# Patient Record
Sex: MALE | Race: WHITE | NOT HISPANIC OR LATINO | URBAN - NONMETROPOLITAN AREA
[De-identification: names, ages, dates, MRNs, and addresses within clinical notes are randomized per-mention and may not be internally consistent; named-entity substitution may affect disease eponyms.]

---

## 2018-07-20 ENCOUNTER — HOSPITAL ENCOUNTER (EMERGENCY)
Facility: HOSPITAL | Age: 43
Discharge: PSYCHIATRIC OR SUBSTANCE USE FACILITY | End: 2018-07-21
Attending: FAMILY MEDICINE | Admitting: FAMILY MEDICINE

## 2018-07-20 ENCOUNTER — HOSPITAL ENCOUNTER (EMERGENCY)
Facility: HOSPITAL | Age: 43
Discharge: HOME | End: 2018-07-20
Attending: INTERNAL MEDICINE | Admitting: INTERNAL MEDICINE

## 2018-07-20 VITALS
BODY MASS INDEX: 38.25 KG/M2 | WEIGHT: 238 LBS | TEMPERATURE: 98.4 F | HEIGHT: 66 IN | RESPIRATION RATE: 18 BRPM | SYSTOLIC BLOOD PRESSURE: 122 MMHG | DIASTOLIC BLOOD PRESSURE: 92 MMHG | HEART RATE: 106 BPM | OXYGEN SATURATION: 100 %

## 2018-07-20 VITALS
BODY MASS INDEX: 31.54 KG/M2 | OXYGEN SATURATION: 99 % | SYSTOLIC BLOOD PRESSURE: 113 MMHG | HEIGHT: 73 IN | RESPIRATION RATE: 18 BRPM | DIASTOLIC BLOOD PRESSURE: 71 MMHG | TEMPERATURE: 98 F | WEIGHT: 238 LBS | HEART RATE: 68 BPM

## 2018-07-20 DIAGNOSIS — F19.10 SUBSTANCE ABUSE (HCC): Primary | ICD-10-CM

## 2018-07-20 DIAGNOSIS — F19.10 POLYSUBSTANCE ABUSE (HCC): Primary | ICD-10-CM

## 2018-07-20 LAB
6-ACETYL MORPHINE: NEGATIVE
6-ACETYL MORPHINE: NEGATIVE
ALBUMIN SERPL-MCNC: 4.3 G/DL (ref 3.5–5)
ALBUMIN SERPL-MCNC: 4.3 G/DL (ref 3.5–5)
ALBUMIN/GLOB SERPL: 1.7 G/DL (ref 1.5–2.5)
ALBUMIN/GLOB SERPL: 1.7 G/DL (ref 1.5–2.5)
ALP SERPL-CCNC: 59 U/L (ref 40–129)
ALP SERPL-CCNC: 61 U/L (ref 40–129)
ALT SERPL W P-5'-P-CCNC: 38 U/L (ref 10–44)
ALT SERPL W P-5'-P-CCNC: 38 U/L (ref 10–44)
AMPHET+METHAMPHET UR QL: POSITIVE
AMPHET+METHAMPHET UR QL: POSITIVE
ANION GAP SERPL CALCULATED.3IONS-SCNC: 3.1 MMOL/L (ref 3.6–11.2)
ANION GAP SERPL CALCULATED.3IONS-SCNC: 3.8 MMOL/L (ref 3.6–11.2)
AST SERPL-CCNC: 22 U/L (ref 10–34)
AST SERPL-CCNC: 22 U/L (ref 10–34)
BARBITURATES UR QL SCN: NEGATIVE
BARBITURATES UR QL SCN: NEGATIVE
BASOPHILS # BLD AUTO: 0.04 10*3/MM3 (ref 0–0.3)
BASOPHILS # BLD AUTO: 0.04 10*3/MM3 (ref 0–0.3)
BASOPHILS NFR BLD AUTO: 0.5 % (ref 0–2)
BASOPHILS NFR BLD AUTO: 0.6 % (ref 0–2)
BENZODIAZ UR QL SCN: NEGATIVE
BENZODIAZ UR QL SCN: NEGATIVE
BILIRUB SERPL-MCNC: 0.3 MG/DL (ref 0.2–1.8)
BILIRUB SERPL-MCNC: 0.4 MG/DL (ref 0.2–1.8)
BILIRUB UR QL STRIP: NEGATIVE
BILIRUB UR QL STRIP: NEGATIVE
BUN BLD-MCNC: 22 MG/DL (ref 7–21)
BUN BLD-MCNC: 22 MG/DL (ref 7–21)
BUN/CREAT SERPL: 23.9 (ref 7–25)
BUN/CREAT SERPL: 24.2 (ref 7–25)
BUPRENORPHINE SERPL-MCNC: POSITIVE NG/ML
BUPRENORPHINE SERPL-MCNC: POSITIVE NG/ML
CALCIUM SPEC-SCNC: 8.8 MG/DL (ref 7.7–10)
CALCIUM SPEC-SCNC: 8.9 MG/DL (ref 7.7–10)
CANNABINOIDS SERPL QL: NEGATIVE
CANNABINOIDS SERPL QL: NEGATIVE
CHLORIDE SERPL-SCNC: 104 MMOL/L (ref 99–112)
CHLORIDE SERPL-SCNC: 109 MMOL/L (ref 99–112)
CLARITY UR: CLEAR
CLARITY UR: CLEAR
CO2 SERPL-SCNC: 26.9 MMOL/L (ref 24.3–31.9)
CO2 SERPL-SCNC: 32.2 MMOL/L (ref 24.3–31.9)
COCAINE UR QL: NEGATIVE
COCAINE UR QL: NEGATIVE
COLOR UR: YELLOW
COLOR UR: YELLOW
CREAT BLD-MCNC: 0.91 MG/DL (ref 0.43–1.29)
CREAT BLD-MCNC: 0.92 MG/DL (ref 0.43–1.29)
DEPRECATED RDW RBC AUTO: 38.9 FL (ref 37–54)
DEPRECATED RDW RBC AUTO: 40.5 FL (ref 37–54)
EOSINOPHIL # BLD AUTO: 0.3 10*3/MM3 (ref 0–0.7)
EOSINOPHIL # BLD AUTO: 0.34 10*3/MM3 (ref 0–0.7)
EOSINOPHIL NFR BLD AUTO: 3.9 % (ref 0–5)
EOSINOPHIL NFR BLD AUTO: 4.9 % (ref 0–5)
ERYTHROCYTE [DISTWIDTH] IN BLOOD BY AUTOMATED COUNT: 12.8 % (ref 11.5–14.5)
ERYTHROCYTE [DISTWIDTH] IN BLOOD BY AUTOMATED COUNT: 13 % (ref 11.5–14.5)
ETHANOL BLD-MCNC: <10 MG/DL
ETHANOL BLD-MCNC: <10 MG/DL
ETHANOL UR QL: <0.01 %
ETHANOL UR QL: <0.01 %
GFR SERPL CREATININE-BSD FRML MDRD: 91 ML/MIN/1.73
GFR SERPL CREATININE-BSD FRML MDRD: 92 ML/MIN/1.73
GLOBULIN UR ELPH-MCNC: 2.6 GM/DL
GLOBULIN UR ELPH-MCNC: 2.6 GM/DL
GLUCOSE BLD-MCNC: 70 MG/DL (ref 70–110)
GLUCOSE BLD-MCNC: 93 MG/DL (ref 70–110)
GLUCOSE UR STRIP-MCNC: NEGATIVE MG/DL
GLUCOSE UR STRIP-MCNC: NEGATIVE MG/DL
HCT VFR BLD AUTO: 42.5 % (ref 42–52)
HCT VFR BLD AUTO: 42.6 % (ref 42–52)
HGB BLD-MCNC: 14.5 G/DL (ref 14–18)
HGB BLD-MCNC: 14.8 G/DL (ref 14–18)
HGB UR QL STRIP.AUTO: NEGATIVE
HGB UR QL STRIP.AUTO: NEGATIVE
IMM GRANULOCYTES # BLD: 0.01 10*3/MM3 (ref 0–0.03)
IMM GRANULOCYTES # BLD: 0.01 10*3/MM3 (ref 0–0.03)
IMM GRANULOCYTES NFR BLD: 0.1 % (ref 0–0.5)
IMM GRANULOCYTES NFR BLD: 0.1 % (ref 0–0.5)
KETONES UR QL STRIP: ABNORMAL
KETONES UR QL STRIP: NEGATIVE
LEUKOCYTE ESTERASE UR QL STRIP.AUTO: NEGATIVE
LEUKOCYTE ESTERASE UR QL STRIP.AUTO: NEGATIVE
LYMPHOCYTES # BLD AUTO: 2.61 10*3/MM3 (ref 1–3)
LYMPHOCYTES # BLD AUTO: 2.74 10*3/MM3 (ref 1–3)
LYMPHOCYTES NFR BLD AUTO: 35.4 % (ref 21–51)
LYMPHOCYTES NFR BLD AUTO: 38 % (ref 21–51)
MAGNESIUM SERPL-MCNC: 2 MG/DL (ref 1.7–2.6)
MCH RBC QN AUTO: 29.3 PG (ref 27–33)
MCH RBC QN AUTO: 29.5 PG (ref 27–33)
MCHC RBC AUTO-ENTMCNC: 34 G/DL (ref 33–37)
MCHC RBC AUTO-ENTMCNC: 34.8 G/DL (ref 33–37)
MCV RBC AUTO: 84.8 FL (ref 80–94)
MCV RBC AUTO: 86.1 FL (ref 80–94)
METHADONE UR QL SCN: NEGATIVE
METHADONE UR QL SCN: NEGATIVE
MONOCYTES # BLD AUTO: 0.58 10*3/MM3 (ref 0.1–0.9)
MONOCYTES # BLD AUTO: 0.64 10*3/MM3 (ref 0.1–0.9)
MONOCYTES NFR BLD AUTO: 7.5 % (ref 0–10)
MONOCYTES NFR BLD AUTO: 9.3 % (ref 0–10)
NEUTROPHILS # BLD AUTO: 3.23 10*3/MM3 (ref 1.4–6.5)
NEUTROPHILS # BLD AUTO: 4.08 10*3/MM3 (ref 1.4–6.5)
NEUTROPHILS NFR BLD AUTO: 47.1 % (ref 30–70)
NEUTROPHILS NFR BLD AUTO: 52.6 % (ref 30–70)
NITRITE UR QL STRIP: NEGATIVE
NITRITE UR QL STRIP: NEGATIVE
OPIATES UR QL: NEGATIVE
OPIATES UR QL: NEGATIVE
OSMOLALITY SERPL CALC.SUM OF ELEC: 280.6 MOSM/KG (ref 273–305)
OSMOLALITY SERPL CALC.SUM OF ELEC: 281.1 MOSM/KG (ref 273–305)
OXYCODONE UR QL SCN: NEGATIVE
OXYCODONE UR QL SCN: NEGATIVE
PCP UR QL SCN: NEGATIVE
PCP UR QL SCN: NEGATIVE
PH UR STRIP.AUTO: <=5 [PH] (ref 5–8)
PH UR STRIP.AUTO: <=5 [PH] (ref 5–8)
PLATELET # BLD AUTO: 229 10*3/MM3 (ref 130–400)
PLATELET # BLD AUTO: 234 10*3/MM3 (ref 130–400)
PMV BLD AUTO: 9.6 FL (ref 6–10)
PMV BLD AUTO: 9.7 FL (ref 6–10)
POTASSIUM BLD-SCNC: 3.7 MMOL/L (ref 3.5–5.3)
POTASSIUM BLD-SCNC: 3.7 MMOL/L (ref 3.5–5.3)
PROT SERPL-MCNC: 6.9 G/DL (ref 6–8)
PROT SERPL-MCNC: 6.9 G/DL (ref 6–8)
PROT UR QL STRIP: NEGATIVE
PROT UR QL STRIP: NEGATIVE
RBC # BLD AUTO: 4.95 10*6/MM3 (ref 4.7–6.1)
RBC # BLD AUTO: 5.01 10*6/MM3 (ref 4.7–6.1)
SODIUM BLD-SCNC: 139 MMOL/L (ref 135–153)
SODIUM BLD-SCNC: 140 MMOL/L (ref 135–153)
SP GR UR STRIP: 1.03 (ref 1–1.03)
SP GR UR STRIP: >=1.03 (ref 1–1.03)
UROBILINOGEN UR QL STRIP: ABNORMAL
UROBILINOGEN UR QL STRIP: NORMAL
WBC NRBC COR # BLD: 6.87 10*3/MM3 (ref 4.5–12.5)
WBC NRBC COR # BLD: 7.75 10*3/MM3 (ref 4.5–12.5)

## 2018-07-20 PROCEDURE — 80307 DRUG TEST PRSMV CHEM ANLYZR: CPT | Performed by: INTERNAL MEDICINE

## 2018-07-20 PROCEDURE — 80307 DRUG TEST PRSMV CHEM ANLYZR: CPT | Performed by: NURSE PRACTITIONER

## 2018-07-20 PROCEDURE — 36415 COLL VENOUS BLD VENIPUNCTURE: CPT

## 2018-07-20 PROCEDURE — 80053 COMPREHEN METABOLIC PANEL: CPT | Performed by: NURSE PRACTITIONER

## 2018-07-20 PROCEDURE — 81003 URINALYSIS AUTO W/O SCOPE: CPT | Performed by: INTERNAL MEDICINE

## 2018-07-20 PROCEDURE — 80053 COMPREHEN METABOLIC PANEL: CPT | Performed by: INTERNAL MEDICINE

## 2018-07-20 PROCEDURE — 81003 URINALYSIS AUTO W/O SCOPE: CPT | Performed by: NURSE PRACTITIONER

## 2018-07-20 PROCEDURE — 85025 COMPLETE CBC W/AUTO DIFF WBC: CPT | Performed by: NURSE PRACTITIONER

## 2018-07-20 PROCEDURE — 83735 ASSAY OF MAGNESIUM: CPT | Performed by: INTERNAL MEDICINE

## 2018-07-20 PROCEDURE — 85025 COMPLETE CBC W/AUTO DIFF WBC: CPT | Performed by: INTERNAL MEDICINE

## 2018-07-20 PROCEDURE — 99283 EMERGENCY DEPT VISIT LOW MDM: CPT

## 2018-07-20 PROCEDURE — 99284 EMERGENCY DEPT VISIT MOD MDM: CPT

## 2018-07-20 RX ORDER — BUPRENORPHINE 2 MG/1
4 TABLET SUBLINGUAL ONCE
Status: COMPLETED | OUTPATIENT
Start: 2018-07-21 | End: 2018-07-21

## 2018-07-21 ENCOUNTER — HOSPITAL ENCOUNTER (INPATIENT)
Facility: HOSPITAL | Age: 43
LOS: 2 days | Discharge: HOME | End: 2018-07-23
Attending: PSYCHIATRY & NEUROLOGY | Admitting: PSYCHIATRY & NEUROLOGY

## 2018-07-21 PROBLEM — F19.10 POLYSUBSTANCE ABUSE (HCC): Status: ACTIVE | Noted: 2018-07-21

## 2018-07-21 PROCEDURE — 93005 ELECTROCARDIOGRAM TRACING: CPT | Performed by: PSYCHIATRY & NEUROLOGY

## 2018-07-21 RX ORDER — LORAZEPAM 1 MG/1
1 TABLET ORAL EVERY 4 HOURS PRN
Status: DISCONTINUED | OUTPATIENT
Start: 2018-07-21 | End: 2018-07-23 | Stop reason: HOSPADM

## 2018-07-21 RX ORDER — FAMOTIDINE 20 MG/1
20 TABLET, FILM COATED ORAL 2 TIMES DAILY PRN
Status: DISCONTINUED | OUTPATIENT
Start: 2018-07-21 | End: 2018-07-23 | Stop reason: HOSPADM

## 2018-07-21 RX ORDER — BENZONATATE 100 MG/1
100 CAPSULE ORAL 3 TIMES DAILY PRN
Status: DISCONTINUED | OUTPATIENT
Start: 2018-07-21 | End: 2018-07-23 | Stop reason: HOSPADM

## 2018-07-21 RX ORDER — ATENOLOL 50 MG/1
100 TABLET ORAL DAILY
Status: CANCELLED | OUTPATIENT
Start: 2018-07-21

## 2018-07-21 RX ORDER — TRAZODONE HYDROCHLORIDE 50 MG/1
50 TABLET ORAL NIGHTLY PRN
Status: DISCONTINUED | OUTPATIENT
Start: 2018-07-21 | End: 2018-07-23 | Stop reason: HOSPADM

## 2018-07-21 RX ORDER — HYDROCHLOROTHIAZIDE 25 MG/1
25 TABLET ORAL DAILY
Status: CANCELLED | OUTPATIENT
Start: 2018-07-21

## 2018-07-21 RX ORDER — LISINOPRIL 10 MG/1
40 TABLET ORAL DAILY
Status: CANCELLED | OUTPATIENT
Start: 2018-07-21

## 2018-07-21 RX ORDER — ONDANSETRON 4 MG/1
4 TABLET, FILM COATED ORAL EVERY 6 HOURS PRN
Status: DISCONTINUED | OUTPATIENT
Start: 2018-07-21 | End: 2018-07-23 | Stop reason: HOSPADM

## 2018-07-21 RX ORDER — HYDROCHLOROTHIAZIDE 25 MG/1
12.5 TABLET ORAL DAILY
COMMUNITY

## 2018-07-21 RX ORDER — IBUPROFEN 600 MG/1
600 TABLET ORAL EVERY 6 HOURS PRN
Status: DISCONTINUED | OUTPATIENT
Start: 2018-07-21 | End: 2018-07-23 | Stop reason: HOSPADM

## 2018-07-21 RX ORDER — LOPERAMIDE HYDROCHLORIDE 2 MG/1
2 CAPSULE ORAL 4 TIMES DAILY PRN
Status: DISCONTINUED | OUTPATIENT
Start: 2018-07-21 | End: 2018-07-23 | Stop reason: HOSPADM

## 2018-07-21 RX ORDER — ALUMINA, MAGNESIA, AND SIMETHICONE 2400; 2400; 240 MG/30ML; MG/30ML; MG/30ML
15 SUSPENSION ORAL EVERY 6 HOURS PRN
Status: DISCONTINUED | OUTPATIENT
Start: 2018-07-21 | End: 2018-07-23 | Stop reason: HOSPADM

## 2018-07-21 RX ORDER — BUPRENORPHINE 2 MG/1
2 TABLET SUBLINGUAL DAILY
Status: COMPLETED | OUTPATIENT
Start: 2018-07-23 | End: 2018-07-23

## 2018-07-21 RX ORDER — BENZTROPINE MESYLATE 1 MG/1
1 TABLET ORAL DAILY PRN
Status: DISCONTINUED | OUTPATIENT
Start: 2018-07-21 | End: 2018-07-23 | Stop reason: HOSPADM

## 2018-07-21 RX ORDER — BUPRENORPHINE 2 MG/1
2 TABLET SUBLINGUAL 3 TIMES DAILY
Status: DISPENSED | OUTPATIENT
Start: 2018-07-21 | End: 2018-07-22

## 2018-07-21 RX ORDER — NICOTINE 21 MG/24HR
1 PATCH, TRANSDERMAL 24 HOURS TRANSDERMAL EVERY 24 HOURS
Status: DISCONTINUED | OUTPATIENT
Start: 2018-07-21 | End: 2018-07-23 | Stop reason: HOSPADM

## 2018-07-21 RX ORDER — BUPRENORPHINE 2 MG/1
2 TABLET SUBLINGUAL DAILY
Status: DISCONTINUED | OUTPATIENT
Start: 2018-07-24 | End: 2018-07-23 | Stop reason: HOSPADM

## 2018-07-21 RX ORDER — BENZTROPINE MESYLATE 1 MG/ML
0.5 INJECTION INTRAMUSCULAR; INTRAVENOUS DAILY PRN
Status: DISCONTINUED | OUTPATIENT
Start: 2018-07-21 | End: 2018-07-23 | Stop reason: HOSPADM

## 2018-07-21 RX ORDER — LISINOPRIL 40 MG/1
40 TABLET ORAL DAILY
Status: ON HOLD | COMMUNITY
End: 2018-07-21 | Stop reason: SINTOL

## 2018-07-21 RX ORDER — HYDROXYZINE 50 MG/1
50 TABLET, FILM COATED ORAL EVERY 6 HOURS PRN
Status: DISCONTINUED | OUTPATIENT
Start: 2018-07-21 | End: 2018-07-23 | Stop reason: HOSPADM

## 2018-07-21 RX ORDER — ATENOLOL 100 MG/1
50 TABLET ORAL DAILY
COMMUNITY

## 2018-07-21 RX ORDER — MULTIVITAMIN
1 TABLET ORAL DAILY
Status: DISCONTINUED | OUTPATIENT
Start: 2018-07-21 | End: 2018-07-23 | Stop reason: HOSPADM

## 2018-07-21 RX ORDER — THIAMINE HCL 50 MG
100 TABLET ORAL DAILY
Status: DISCONTINUED | OUTPATIENT
Start: 2018-07-21 | End: 2018-07-23 | Stop reason: HOSPADM

## 2018-07-21 RX ADMIN — IBUPROFEN 600 MG: 600 TABLET ORAL at 08:38

## 2018-07-21 RX ADMIN — Medication 1 TABLET: at 08:37

## 2018-07-21 RX ADMIN — IBUPROFEN 600 MG: 600 TABLET ORAL at 14:37

## 2018-07-21 RX ADMIN — LORAZEPAM 1 MG: 1 TABLET ORAL at 15:44

## 2018-07-21 RX ADMIN — THIAMINE HCL (VITAMIN B1) 50 MG TABLET 100 MG: at 08:37

## 2018-07-21 RX ADMIN — HYDROXYZINE HYDROCHLORIDE 50 MG: 50 TABLET ORAL at 14:37

## 2018-07-21 RX ADMIN — HYDROXYZINE HYDROCHLORIDE 50 MG: 50 TABLET ORAL at 01:32

## 2018-07-21 RX ADMIN — IBUPROFEN 600 MG: 600 TABLET ORAL at 01:32

## 2018-07-21 RX ADMIN — BUPRENORPHINE HCL 4 MG: 2 TABLET SUBLINGUAL at 01:32

## 2018-07-21 NOTE — PLAN OF CARE
Problem: Patient Care Overview  Goal: Plan of Care Review  Outcome: Ongoing (interventions implemented as appropriate)   07/21/18 0132   Coping/Psychosocial   Plan of Care Reviewed With patient   Coping/Psychosocial   Patient Agreement with Plan of Care agrees   Plan of Care Review   Progress no change   OTHER   Outcome Summary Patient new admit to unit, admission assessment completed, pt orientated to unit, unit rules, room number and prn meds.

## 2018-07-21 NOTE — PLAN OF CARE
Problem: Impaired Control (Excessive Substance Use) (Adult)  Goal: Participates in Recovery Program (Excessive Substance Use)  Outcome: Ongoing (interventions implemented as appropriate)

## 2018-07-21 NOTE — PROGRESS NOTES
Family Education    Spoke with the patient, his wife, and his sister. They were supportive. They asked about the length of stay and aftercare options. The therapist provided education. It seemed outpatient therapy was preferable, considering the patient's work schedule. [facility] was suggested, and the patient was agreeable. However, this option had been attempted in the past, and there was a 4 week waiting list, per sister's report.

## 2018-07-21 NOTE — PLAN OF CARE
Problem: Patient Care Overview  Goal: Plan of Care Review  Outcome: Ongoing (interventions implemented as appropriate)   07/21/18 0238   Coping/Psychosocial   Plan of Care Reviewed With patient   Coping/Psychosocial   Patient Agreement with Plan of Care agrees   Plan of Care Review   Progress no change   OTHER   Outcome Summary New admit @ 0040 for polysubstance abuse.       Problem: Overarching Goals (Adult)  Goal: Adheres to Safety Considerations for Self and Others  Outcome: Ongoing (interventions implemented as appropriate)    Goal: Optimized Coping Skills in Response to Life Stressors  Outcome: Ongoing (interventions implemented as appropriate)    Goal: Develops/Participates in Therapeutic [place] to Support Successful Transition  Outcome: Ongoing (interventions implemented as appropriate)
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
## 2018-07-21 NOTE — PLAN OF CARE
Problem: Patient Care Overview  Goal: Plan of Care Review  Outcome: Ongoing (interventions implemented as appropriate)   07/21/18 7443   Coping/Psychosocial   Plan of Care Reviewed With patient   Coping/Psychosocial   Patient Agreement with Plan of Care agrees   Plan of Care Review   Progress improving   OTHER   Outcome Summary Patient has been out of room but very anxous and having body aches. Cooperative and denies cravings.       Problem: Overarching Goals (Adult)  Goal: Adheres to Safety Considerations for Self and Others  Outcome: Ongoing (interventions implemented as appropriate)    Goal: Optimized Coping Skills in Response to Life Stressors  Outcome: Ongoing (interventions implemented as appropriate)    Goal: Develops/Participates in Therapeutic [place] to Support Successful Transition  Outcome: Ongoing (interventions implemented as appropriate)

## 2018-07-21 NOTE — PLAN OF CARE
Problem: Patient Care Overview  Goal: Plan of Care Review  Outcome: Ongoing (interventions implemented as appropriate)   07/21/18 1115   Coping/Psychosocial   Plan of Care Reviewed With patient   Coping/Psychosocial   Patient Agreement with Plan of Care agrees   Plan of Care Review   Progress no change   OTHER   Outcome Summary Completed the social history assessment and reviewed the treatment plans.    Coping/Psychosocial   Consent Given to Review Plan with Leatha Arreola (sister) [phone]. Abby Lovell (spouse) [phone].      Goal: Individualization and Mutuality  Outcome: Ongoing (interventions implemented as appropriate)   07/21/18 1115   Personal Strengths/Vulnerabilities   Patient Personal Strengths motivated for treatment;resilient;resourceful;family/social support;positive educational history;positive vocational history;socioeconomic stability;stable living environment;spiritual/Yazdanism support   Patient Vulnerabilities Disease of addiction.    Individualization   Patient Specific Goals (Include Timeframe) Maintain sobriety. Begin outpatient therapy.    Patient Specific Interventions Individual and group therapy.      Goal: Discharge Needs Assessment  Outcome: Ongoing (interventions implemented as appropriate)   07/21/18 1115   Discharge Needs Assessment   Readmission Within the Last 30 Days no previous admission in last 30 days   Concerns to be Addressed discharge planning;substance/tobacco abuse/use   Patient/Family Anticipates Transition to home with family   Patient/Family Anticipated Services at Transition mental health services   Transportation Concerns car, none   Transportation Anticipated family or friend will provide   Offered/Gave Vendor List yes   Patient's Choice of Community Agency(s) [facility].    Current Discharge Risk substance use/abuse   Discharge Coordination/Progress The patient was agreeable to follow up with outpatient therapy at [facility].     Discharge Needs Assessment,    Outpatient/Agency/Support Group Needs 12 step program (specify);support group(s) (specify);outpatient substance abuse treatment (specify)   Anticipated Discharge Disposition still a patient     Goal: Interprofessional Rounds/Family Conf  Outcome: Ongoing (interventions implemented as appropriate)   07/21/18 1115   Interdisciplinary Rounds/Family Conf   Summary The patient's case will be discussed with the treatment team.    Interdisciplinary Rounds/Family Conf   Participants patient;social work;psychiatrist;nursing       [id number]  D: Met with the patient in the office, completing the social history assessment and reviewing the treatment plans. The patient was agreeable. The patient is a 42-year-old  male currently residing in [place], [place] where he has been living with his wife of 10 years and his 6 year old daughter. They lost their home last year, and they have been living in a double wide for one year now. 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 He has one semester of college education. He identified himself as struggling with alcohol misuse, methampehtamine use, and addiction. He has no history of detoxification treatment here or elsewhere.    A: The patient's affect appeared depressed. He seemed remorseful regarding the consequences of his substance misuse and addiction. He seemed receptive to the therapist's encouragement that he was not alone, nor could he expect himself to do this alone. It seemed he had healthy sources of support    P: The patient has been placed on a detox regimen. He will be monitored for his safety. He will be provided with individual and group therapy. He will follow up with outpatient therapy. The therapist spoke with his family at visitation. All were in agreement that [facility] would be a good option for outpatient therapy. The patient signed a release.

## 2018-07-22 PROCEDURE — 99231 SBSQ HOSP IP/OBS SF/LOW 25: CPT | Performed by: PSYCHIATRY & NEUROLOGY

## 2018-07-22 RX ORDER — HYDRALAZINE HYDROCHLORIDE 25 MG/1
25 TABLET, FILM COATED ORAL ONCE
Status: COMPLETED | OUTPATIENT
Start: 2018-07-22 | End: 2018-07-22

## 2018-07-22 RX ADMIN — HYDROXYZINE HYDROCHLORIDE 50 MG: 50 TABLET ORAL at 18:49

## 2018-07-22 RX ADMIN — IBUPROFEN 600 MG: 600 TABLET ORAL at 18:49

## 2018-07-22 RX ADMIN — BUPRENORPHINE HCL 2 MG: 2 TABLET SUBLINGUAL at 09:22

## 2018-07-22 RX ADMIN — Medication 1 TABLET: at 09:22

## 2018-07-22 RX ADMIN — THIAMINE HCL (VITAMIN B1) 50 MG TABLET 100 MG: at 09:22

## 2018-07-22 RX ADMIN — LORAZEPAM 1 MG: 1 TABLET ORAL at 09:25

## 2018-07-22 RX ADMIN — LORAZEPAM 1 MG: 1 TABLET ORAL at 21:01

## 2018-07-22 RX ADMIN — IBUPROFEN 600 MG: 600 TABLET ORAL at 11:18

## 2018-07-22 RX ADMIN — BUPRENORPHINE HCL 2 MG: 2 TABLET SUBLINGUAL at 16:29

## 2018-07-22 RX ADMIN — TRAZODONE HYDROCHLORIDE 50 MG: 50 TABLET ORAL at 21:02

## 2018-07-22 RX ADMIN — HYDRALAZINE HYDROCHLORIDE 25 MG: 25 TABLET ORAL at 22:40

## 2018-07-22 NOTE — PLAN OF CARE
Problem: Patient Care Overview  Goal: Plan of Care Review  Outcome: Ongoing (interventions implemented as appropriate)   07/22/18 0239   Coping/Psychosocial   Plan of Care Reviewed With patient   Coping/Psychosocial   Patient Agreement with Plan of Care agrees   Plan of Care Review   Progress improving   OTHER   Outcome Summary no changes, no new orders

## 2018-07-22 NOTE — PLAN OF CARE
Problem: Patient Care Overview  Goal: Plan of Care Review  Outcome: Ongoing (interventions implemented as appropriate)   07/22/18 0509   Coping/Psychosocial   Plan of Care Reviewed With patient   Coping/Psychosocial   Patient Agreement with Plan of Care agrees   Plan of Care Review   Progress improving   OTHER   Outcome Summary Patient has been very cooperative today, out of room most of the day, having tremors, hot & cold body aches.       Problem: Overarching Goals (Adult)  Goal: Adheres to Safety Considerations for Self and Others  Outcome: Ongoing (interventions implemented as appropriate)    Goal: Optimized Coping Skills in Response to Life Stressors  Outcome: Ongoing (interventions implemented as appropriate)    Goal: Develops/Participates in Therapeutic [place] to Support Successful Transition  Outcome: Ongoing (interventions implemented as appropriate)

## 2018-07-23 VITALS
OXYGEN SATURATION: 97 % | BODY MASS INDEX: 36.48 KG/M2 | SYSTOLIC BLOOD PRESSURE: 142 MMHG | HEART RATE: 118 BPM | HEIGHT: 66 IN | WEIGHT: 227 LBS | TEMPERATURE: 97.9 F | RESPIRATION RATE: 16 BRPM | DIASTOLIC BLOOD PRESSURE: 99 MMHG

## 2018-07-23 PROBLEM — F10.20 ALCOHOL USE DISORDER, MODERATE, DEPENDENCE (HCC): Status: ACTIVE | Noted: 2018-07-23

## 2018-07-23 PROBLEM — F11.23 OPIOID DEPENDENCE WITH WITHDRAWAL (HCC): Status: ACTIVE | Noted: 2018-07-21

## 2018-07-23 PROCEDURE — 99238 HOSP IP/OBS DSCHRG MGMT 30/<: CPT | Performed by: PSYCHIATRY & NEUROLOGY

## 2018-07-23 RX ORDER — ECHINACEA PURPUREA EXTRACT 125 MG
1 TABLET ORAL AS NEEDED
Status: DISCONTINUED | OUTPATIENT
Start: 2018-07-23 | End: 2018-07-23 | Stop reason: HOSPADM

## 2018-07-23 RX ADMIN — Medication 1 TABLET: at 09:55

## 2018-07-23 RX ADMIN — THIAMINE HCL (VITAMIN B1) 50 MG TABLET 100 MG: at 09:55

## 2018-07-23 RX ADMIN — CLONIDINE 1 PATCH: 0.1 PATCH TRANSDERMAL at 12:33

## 2018-07-23 RX ADMIN — BUPRENORPHINE HCL 2 MG: 2 TABLET SUBLINGUAL at 09:56

## 2018-07-23 NOTE — DISCHARGE SUMMARY
"      PSYCHIATRIC DISCHARGE SUMMARY     Patient Identification:  Name:  Mitchell Shipman  Age:  42 y.o.  Sex:  male  :  1975  MRN:  [id number]  Visit Number:  [id number]      Date of Admission:2018   Date of Discharge:  2018    Discharge Diagnosis:  Active Problems:    Opioid dependence with withdrawal (CMS/HCC)    Alcohol use disorder, moderate, dependence (CMS/[facility])        Admission Diagnosis:  Polysubstance abuse [F19.10]     Hospital Course  Patient is a 42 y.o. male presented with opioid and alcohol dependence and withdrawal.  The patient was started on a brief Subutex taper which he tolerated without complications.  He also receives supportive medications for opioid withdrawal.  He was monitored for alcohol withdrawal but did not require Ativan.  By the second hospital day, the patient reported resolution of withdrawal symptoms.  We discussed that he may have some residual withdrawal symptoms in the coming hours because he had been receiving Subutex.  He elected to be discharged so that he could return to work.  He seemed to be very motivated and also wanted to follow-up with [facility] and look into getting the Vivitrol injection.  We discussed this option with him and encouraged him to follow up with him next week.  Also, on the day of discharge, he was given clonidine 0.1 mg patch to help with ongoing withdrawal symptoms upon discharge.  He was in shock to do with this for one week.    Mental Status Exam upon discharge:   Mood \"average\"   Affect- constricted  Thought Content-goal directed, no delusional material present  Thought process-linear, organized.  Suicidality: No SI  Homicidality: No HI  Perception: No AH/VH    Procedures Performed         Consults:   Consults     No orders found from 2018 to 2018.          Pertinent Test Results:   Results for MITCHELL SHIPMAN (MRN [id number]) as of 2018 12:04   Ref. Range 2018 22:36 2018 22:37   Glucose Latest Ref " Range: 70 - 110 mg/dL 70    Sodium Latest Ref Range: 135 - 153 mmol/L 140    Potassium Latest Ref Range: 3.5 - 5.3 mmol/L 3.7    CO2 Latest Ref Range: 24.3 - 31.9 mmol/L 32.2 (H)    Chloride Latest Ref Range: 99 - 112 mmol/L 104    Anion Gap Latest Ref Range: 3.6 - 11.2 mmol/L 3.8    Creatinine Latest Ref Range: 0.43 - 1.29 mg/dL 0.92    BUN Latest Ref Range: 7 - 21 mg/dL 22 (H)    BUN/Creatinine Ratio Latest Ref Range: 7.0 - 25.0  23.9    Calcium Latest Ref Range: 7.7 - 10.0 mg/dL 8.9    eGFR Non African Amer Latest Ref Range: >60 mL/min/1.73 91    Alkaline Phosphatase Latest Ref Range: 40 - 129 U/L 61    Total Protein Latest Ref Range: 6.0 - 8.0 g/dL 6.9    ALT (SGPT) Latest Ref Range: 10 - 44 U/L 38    AST (SGOT) Latest Ref Range: 10 - 34 U/L 22    Total Bilirubin Latest Ref Range: 0.2 - 1.8 mg/dL 0.3    Albumin Latest Ref Range: 3.50 - 5.00 g/dL 4.30    Globulin Latest Units: gm/dL 2.6    A/G Ratio Latest Ref Range: 1.5 - 2.5 g/dL 1.7    Osmolality Calc Latest Ref Range: 273.0 - 305.0 mOsm/kg 281.1    WBC Latest Ref Range: 4.50 - 12.50 10*3/mm3 6.87    RBC Latest Ref Range: 4.70 - 6.10 10*6/mm3 4.95    Hemoglobin Latest Ref Range: 14.0 - 18.0 g/dL 14.5    Hematocrit Latest Ref Range: 42.0 - 52.0 % 42.6    RDW Latest Ref Range: 11.5 - 14.5 % 13.0    MCV Latest Ref Range: 80.0 - 94.0 fL 86.1    MCH Latest Ref Range: 27.0 - 33.0 pg 29.3    MCHC Latest Ref Range: 33.0 - 37.0 g/dL 34.0    MPV Latest Ref Range: 6.0 - 10.0 fL 9.7    Platelets Latest Ref Range: 130 - 400 10*3/mm3 229    RDW-SD Latest Ref Range: 37.0 - 54.0 fl 40.5    Neutrophil % Latest Ref Range: 30.0 - 70.0 % 47.1    Lymphocyte % Latest Ref Range: 21.0 - 51.0 % 38.0    Monocyte % Latest Ref Range: 0.0 - 10.0 % 9.3    Eosinophil % Latest Ref Range: 0.0 - 5.0 % 4.9    Basophil % Latest Ref Range: 0.0 - 2.0 % 0.6    Immature Grans % Latest Ref Range: 0.0 - 0.5 % 0.1    Neutrophils, Absolute Latest Ref Range: 1.40 - 6.50 10*3/mm3 3.23    Lymphocytes,  Absolute Latest Ref Range: 1.00 - 3.00 10*3/mm3 2.61    Monocytes, Absolute Latest Ref Range: 0.10 - 0.90 10*3/mm3 0.64    Eosinophils, Absolute Latest Ref Range: 0.00 - 0.70 10*3/mm3 0.34    Basophils, Absolute Latest Ref Range: 0.00 - 0.30 10*3/mm3 0.04    Immature Grans, Absolute Latest Ref Range: 0.00 - 0.03 10*3/mm3 0.01    Color, UA Latest Ref Range: Yellow, Straw   Yellow   Appearance, UA Latest Ref Range: Clear   Clear   Specific [place], UA Latest Ref Range: 1.005 - 1.030   1.029   pH, UA Latest Ref Range: 5.0 - 8.0   <=5.0   Glucose, UA Latest Ref Range: Negative   Negative   Ketones, UA Latest Ref Range: Negative   Trace (A)   Blood, UA Latest Ref Range: Negative   Negative   Nitrite, UA Latest Ref Range: Negative   Negative   Leuk Esterase, UA Latest Ref Range: Negative   Negative   Protein, UA Latest Ref Range: Negative   Negative   Bilirubin, UA Latest Ref Range: Negative   Negative   Urobilinogen, UA Latest Ref Range: 0.2 - 1.0 E.U./dL   1.0 E.U./dL   Ethanol % Latest Units: % <0.010    Ethanol Latest Ref Range: <=10 mg/dL <10    6-ACETYL MORPHINE Latest Ref Range: Negative   Negative   Amphetamine Screen, Urine Latest Ref Range: Negative   Positive (A)   Barbiturates Screen, Urine Latest Ref Range: Negative   Negative   Benzodiazepine Screen, Urine Latest Ref Range: Negative   Negative   Buprenorphine, Screen, Urine Latest Ref Range: Negative   Positive (A)   Cocaine Screen, Urine Latest Ref Range: Negative   Negative   Methadone Screen , Urine Latest Ref Range: Negative   Negative   Opiate Screen, Urine Latest Ref Range: Negative   Negative   Oxycodone Screen, Urine Latest Ref Range: Negative   Negative   Phencyclidine (PCP), Urine Latest Ref Range: Negative   Negative   THC Screen, Urine Latest Ref Range: Negative   Negative     Condition on Discharge:  stable    Vital Signs  Temp:  [96.7 °F (35.9 °C)-98.1 °F (36.7 °C)] 96.7 °F (35.9 °C)  Heart Rate:  [] 101  Resp:  [18] 18  BP:  (577-152)/(94-99 136/96      Discharge Disposition:  Home or Self Care    Discharge Medications:     Discharge Medications      Continue These Medications      Instructions Start Date   atenolol 100 MG tablet  Commonly known as:  TENORMIN   50 mg, Oral, Daily      hydrochlorothiazide 25 MG tablet  Commonly known as:  HYDRODIURIL   12.5 mg, Oral, Daily             Discharge Diet:  regular    Activity at Discharge:  as tolerated    Follow-up Appointments  [facility]    Test Results Pending at Discharge      Clinician:   Juan José Louis MD  07/23/18  12:03 PM

## 2018-07-23 NOTE — PROGRESS NOTES
"1    ID:Mitchell Lovell is a 42 y.o. male    CC: Opiate Detox    Interval History: Today he stated that he is having withdrawal symptoms as noted below. Per staff he has reported that he has a history of abusing Nasal Spray, thus this was discontinued.  He denied SI/HI/aVH.     Depression rating 6/10  Anxiety rating 10  Sleep: poor  Withdrawal sx: see ROS  Cravin/10    Review of Systems   Constitutional: Positive for chills, diaphoresis and fatigue.   Respiratory: Negative.    Cardiovascular: Negative.    Gastrointestinal: Positive for diarrhea and nausea.   Musculoskeletal: Positive for myalgias.       Temp:  [96.9 °F (36.1 °C)-98.4 °F (36.9 °C)] 96.9 °F (36.1 °C)  Heart Rate:  [] 136  Resp:  [16-18] 18  BP: (118-150)/(67-97) 150/96    MENTAL STATUS EXAM:  Appearance:fair hygeine.   Cooperation:Cooperative  Orientation: Ox4  Gait and station stable.   Psychomotor: No psychomotor agitation/retardation, No EPS, No motor tics  Speech-normal rate, amount.  Mood \"okay\"   Affect-congruent/appropriate.  Thought Content-goal directed, no delusional material present  Thought process-linear, organized.  Suicidality: No SI  Homicidality: No HI  Perception: No AH/VH  Insight-fair   Judgement-fair    Lab Results (last 24 hours)     ** No results found for the last 24 hours. **          ALLERGIES: Sulfa antibiotics      Current Facility-Administered Medications:   •  aluminum-magnesium hydroxide-simethicone (MAALOX MAX) 400-400-40 MG/5ML suspension 15 mL, 15 mL, Oral, Q6H PRN, Juan José Louis MD  •  benzonatate (TESSALON) capsule 100 mg, 100 mg, Oral, TID PRN, Juan José Louis MD  •  benztropine (COGENTIN) tablet 1 mg, 1 mg, Oral, Daily PRN **OR** benztropine (COGENTIN) injection 0.5 mg, 0.5 mg, Intramuscular, Daily PRN, Juan José Louis MD  •  [] buprenorphine (SUBUTEX) SL tablet 2 mg, 2 mg, Sublingual, TID, 2 mg at 18 2018 **FOLLOWED BY** [START ON 2018] buprenorphine (SUBUTEX) SL " tablet 2 mg, 2 mg, Sublingual, Daily **FOLLOWED BY** [START ON 7/24/2018] buprenorphine (SUBUTEX) SL tablet 2 mg, 2 mg, Sublingual, Daily, Geoff Fitzgerald MD  •  famotidine (PEPCID) tablet 20 mg, 20 mg, Oral, BID PRN, Juan José Louis MD  •  hydrOXYzine (ATARAX) tablet 50 mg, 50 mg, Oral, Q6H PRN, Juan José Louis MD, 50 mg at 07/22/18 1849  •  ibuprofen (ADVIL,MOTRIN) tablet 600 mg, 600 mg, Oral, Q6H PRN, Juan José Louis MD, 600 mg at 07/22/18 1849  •  loperamide (IMODIUM) capsule 2 mg, 2 mg, Oral, 4x Daily PRN, Juan José Louis MD  •  LORazepam (ATIVAN) tablet 1 mg, 1 mg, Oral, Q4H PRN, Juan José Louis MD, 1 mg at 07/22/18 2101  •  magnesium hydroxide (MILK OF MAGNESIA) suspension 2400 mg/10mL 10 mL, 10 mL, Oral, Daily PRN, Juan José Louis MD  •  multivitamin (DAILY VALERIANO) tablet 1 tablet, 1 tablet, Oral, Daily, Juan José Louis MD, 1 tablet at 07/22/18 0922  •  nicotine (NICODERM CQ) 21 MG/24HR patch 1 patch, 1 patch, Transdermal, Q24H, Juan José Louis MD  •  ondansetron (ZOFRAN) tablet 4 mg, 4 mg, Oral, Q6H PRN, Juan José Louis MD  •  traZODone (DESYREL) tablet 50 mg, 50 mg, Oral, Nightly PRN, Juan José Louis MD, 50 mg at 07/22/18 2102  •  vitamin B-1 tablet 100 mg, 100 mg, Oral, Daily, Juan José Louis MD, 100 mg at 07/22/18 0922  •  aluminum-magnesium hydroxide-simethicone  •  benzonatate  •  benztropine **OR** benztropine  •  famotidine  •  hydrOXYzine  •  ibuprofen  •  loperamide  •  LORazepam  •  magnesium hydroxide  •  ondansetron  •  traZODone    SAFETY PRECAUTIONS: SP LEVEL III    ASSESSMENT/PLAN:  Active Problems:  Opiate use disorder, in withdrawal   Plan: Continue Subutex taper, Comfort medications, COWS, SW to assist with resources    I have reviewed the treatment plan and agree with current plan.  Treatment was discussed with the patient who is agreeable to this treatment and plan.

## 2018-07-23 NOTE — NURSING NOTE
Clonidine patch applied, pt instructed on use, s/e, and duration, Mitchell stated that he understood.

## 2018-07-23 NOTE — PROGRESS NOTES
Contacted [employer]. They requested a medication list and demographics before scheduling the patient's aftercare appt.

## 2018-07-23 NOTE — PROGRESS NOTES
The patient is being discharged today. His sister and spouse will provide transportation. He will follow up with [facility] for outpatient therapy, and an appointment has been scheduled on his behalf.

## 2018-07-23 NOTE — PLAN OF CARE
Problem: Patient Care Overview  Goal: Plan of Care Review  Outcome: Ongoing (interventions implemented as appropriate)   07/23/18 0352   Coping/Psychosocial   Plan of Care Reviewed With patient   Coping/Psychosocial   Patient Agreement with Plan of Care agrees   Plan of Care Review   Progress improving   OTHER   Outcome Summary Pt attended AA group       Problem: Overarching Goals (Adult)  Goal: Adheres to Safety Considerations for Self and Others  Outcome: Ongoing (interventions implemented as appropriate)    Goal: Optimized Coping Skills in Response to Life Stressors  Outcome: Ongoing (interventions implemented as appropriate)    Goal: Develops/Participates in Therapeutic [place] to Support Successful Transition  Outcome: Ongoing (interventions implemented as appropriate)      Problem: Impaired Control (Excessive Substance Use) (Adult)  Goal: Participates in Recovery Program (Excessive Substance Use)  Outcome: Ongoing (interventions implemented as appropriate)      Problem: Social/Occupational/Functional Impairment (Excessive Substance Use) (Adult)  Goal: Improved Social/Occupational/Functional Skills (Excessive Substance Use)  Outcome: Ongoing (interventions implemented as appropriate)      Problem: Safety Awareness Impairment (Excessive Substance Use) (Adult)  Goal: Enhanced Safety Awareness (Excessive Substance Use)  Outcome: Ongoing (interventions implemented as appropriate)      Problem: Physiological Impairment (Excessive Substance Use) (Adult)  Goal: Improved Physiologic Symptoms (Excessive Substance Use)  Outcome: Ongoing (interventions implemented as appropriate)

## 2018-07-23 NOTE — DISCHARGE INSTR - APPOINTMENTS
[facility]  [address] [facility] [address]    [phone]    Appt: August 6th@ 1:30 with Ravi Sadler  You may call and check for cancellations for a quicker appt.
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
## 2018-07-25 NOTE — PROGRESS NOTES
Received a call from [facility] stating that they have an earlier appt for the patient. They have rescheduled the patient's appt for July 27th @ 2:15PM. I attempted to contact the patient at his phone listed on his face sheet but he was unreachable. Left a message on his sisters phone to call me back so that I can inform of the appt changes.